# Patient Record
Sex: FEMALE | Race: BLACK OR AFRICAN AMERICAN | NOT HISPANIC OR LATINO | URBAN - METROPOLITAN AREA
[De-identification: names, ages, dates, MRNs, and addresses within clinical notes are randomized per-mention and may not be internally consistent; named-entity substitution may affect disease eponyms.]

---

## 2018-06-11 ENCOUNTER — EMERGENCY (EMERGENCY)
Facility: HOSPITAL | Age: 73
LOS: 1 days | Discharge: HOME | End: 2018-06-11
Attending: EMERGENCY MEDICINE
Payer: COMMERCIAL

## 2018-06-11 VITALS
DIASTOLIC BLOOD PRESSURE: 72 MMHG | HEART RATE: 69 BPM | SYSTOLIC BLOOD PRESSURE: 126 MMHG | TEMPERATURE: 98 F | HEIGHT: 66 IN | WEIGHT: 177.91 LBS | OXYGEN SATURATION: 98 % | RESPIRATION RATE: 16 BRPM

## 2018-06-11 VITALS
SYSTOLIC BLOOD PRESSURE: 132 MMHG | OXYGEN SATURATION: 98 % | DIASTOLIC BLOOD PRESSURE: 68 MMHG | TEMPERATURE: 98 F | RESPIRATION RATE: 17 BRPM | HEART RATE: 65 BPM

## 2018-06-11 PROCEDURE — 99053 MED SERV 10PM-8AM 24 HR FAC: CPT

## 2018-06-11 PROCEDURE — 99284 EMERGENCY DEPT VISIT MOD MDM: CPT | Mod: 25

## 2018-06-12 PROCEDURE — 72125 CT NECK SPINE W/O DYE: CPT

## 2018-06-12 PROCEDURE — 70450 CT HEAD/BRAIN W/O DYE: CPT | Mod: 26

## 2018-06-12 PROCEDURE — 99284 EMERGENCY DEPT VISIT MOD MDM: CPT | Mod: 25

## 2018-06-12 PROCEDURE — 70450 CT HEAD/BRAIN W/O DYE: CPT

## 2018-06-12 PROCEDURE — 72125 CT NECK SPINE W/O DYE: CPT | Mod: 26

## 2018-06-12 RX ORDER — ACETAMINOPHEN 500 MG
2 TABLET ORAL
Qty: 20 | Refills: 0 | OUTPATIENT
Start: 2018-06-12

## 2018-06-12 RX ORDER — ACETAMINOPHEN 500 MG
650 TABLET ORAL ONCE
Qty: 0 | Refills: 0 | Status: COMPLETED | OUTPATIENT
Start: 2018-06-12 | End: 2018-06-12

## 2018-06-12 RX ADMIN — Medication 650 MILLIGRAM(S): at 00:43

## 2018-06-12 RX ADMIN — Medication 650 MILLIGRAM(S): at 00:47

## 2018-06-12 NOTE — ED PROVIDER NOTE - OBJECTIVE STATEMENT
71 y/o F pt with no significant PMHx and no significant PSHx presents to the ED c/o intermittent R sided headache after mechanical fall x yesterday at 1650. Pt reports she was at [facility], when she tripped and fell over a suitcase, hitting her R head and shoulder. Pt's pmd instructed pt to take Tylenol for her pain, which pt has been taking to no relief. Pt denies LOC, nausea, vomiting or any other complaints. Allergies: Aspirin. (PMD: Dr. Finley).

## 2018-06-12 NOTE — ED PROVIDER NOTE - PHYSICAL EXAMINATION
GCS 15, no raccoon eyes, no Battles sign, no scalp step off deformities.   Pt c/o R forehead tenderness.

## 2018-06-12 NOTE — ED PROVIDER NOTE - MEDICAL DECISION MAKING DETAILS
No TBI on CT head, no fx on CT neck. Pt is well appearing walking with normal gait, stable for discharge and follow up with medical doctor. Pt educated on care and need for follow up. Discussed anticipatory guidance and return precautions. Questions answered. I had a detailed discussion with the patient and/or guardian regarding the historical points, exam findings, and any diagnostic results supporting the discharge diagnosis.

## 2018-06-15 ENCOUNTER — EMERGENCY (EMERGENCY)
Facility: HOSPITAL | Age: 73
LOS: 1 days | Discharge: HOME | End: 2018-06-15
Attending: EMERGENCY MEDICINE
Payer: COMMERCIAL

## 2018-06-15 VITALS
DIASTOLIC BLOOD PRESSURE: 90 MMHG | HEART RATE: 76 BPM | SYSTOLIC BLOOD PRESSURE: 155 MMHG | RESPIRATION RATE: 16 BRPM | TEMPERATURE: 98 F | WEIGHT: 169.98 LBS | OXYGEN SATURATION: 99 % | HEIGHT: 66 IN

## 2018-06-15 PROCEDURE — 99284 EMERGENCY DEPT VISIT MOD MDM: CPT

## 2018-06-15 PROCEDURE — 70450 CT HEAD/BRAIN W/O DYE: CPT

## 2018-06-15 PROCEDURE — 70450 CT HEAD/BRAIN W/O DYE: CPT | Mod: 26

## 2018-06-15 PROCEDURE — 99284 EMERGENCY DEPT VISIT MOD MDM: CPT | Mod: 25

## 2018-06-15 PROCEDURE — 99053 MED SERV 10PM-8AM 24 HR FAC: CPT

## 2018-06-15 NOTE — ED PROVIDER NOTE - MEDICAL DECISION MAKING DETAILS
73y/o F pt. concern for delay bleed. Will get CT Head. nosebleed does not seem to be CSF leak. will not do any further workup for that.

## 2018-06-15 NOTE — ED PROVIDER NOTE - MUSCULOSKELETAL, MLM
Spine appears normal, range of motion is not limited, no muscle or joint tenderness, no C-spine tenderness

## 2018-06-15 NOTE — ED PROVIDER NOTE - OBJECTIVE STATEMENT
73 y/o F pt w/ who was seen here about x1 week ago about having a fall in a luggage store when product slipped and had head strike at that time. Pt reports negative head CT. Since then, pt states that she has had persistent HAs. Pt denies vomiting, LOC at any time, or any other complaints. Today, she also noticed R sided nose-bleeding which self-resolved after holding pressure. Pt denies nose picking or aspirin, Plavix, or blood thinners use. Allergies: aspirin.

## 2018-06-15 NOTE — ED ADULT TRIAGE NOTE - CHIEF COMPLAINT QUOTE
" I have pain on my forehead and down to my nose, I hit my head last Sunday and I was here Monday and they did some test "

## 2018-06-16 VITALS
OXYGEN SATURATION: 98 % | RESPIRATION RATE: 16 BRPM | HEART RATE: 56 BPM | DIASTOLIC BLOOD PRESSURE: 86 MMHG | TEMPERATURE: 98 F | SYSTOLIC BLOOD PRESSURE: 133 MMHG

## 2019-01-25 ENCOUNTER — EMERGENCY (EMERGENCY)
Facility: HOSPITAL | Age: 74
LOS: 1 days | Discharge: HOME | End: 2019-01-25
Attending: EMERGENCY MEDICINE
Payer: COMMERCIAL

## 2019-01-25 VITALS — WEIGHT: 175.93 LBS | HEIGHT: 66 IN

## 2019-01-25 VITALS
OXYGEN SATURATION: 97 % | HEART RATE: 80 BPM | DIASTOLIC BLOOD PRESSURE: 76 MMHG | TEMPERATURE: 98 F | RESPIRATION RATE: 18 BRPM | SYSTOLIC BLOOD PRESSURE: 129 MMHG

## 2019-01-25 PROCEDURE — 73562 X-RAY EXAM OF KNEE 3: CPT | Mod: 26,RT

## 2019-01-25 PROCEDURE — 99284 EMERGENCY DEPT VISIT MOD MDM: CPT

## 2019-01-25 PROCEDURE — 82962 GLUCOSE BLOOD TEST: CPT

## 2019-01-25 PROCEDURE — 93971 EXTREMITY STUDY: CPT

## 2019-01-25 PROCEDURE — 73590 X-RAY EXAM OF LOWER LEG: CPT | Mod: 26,RT

## 2019-01-25 PROCEDURE — 73590 X-RAY EXAM OF LOWER LEG: CPT

## 2019-01-25 PROCEDURE — 93971 EXTREMITY STUDY: CPT | Mod: 26,RT

## 2019-01-25 PROCEDURE — 73552 X-RAY EXAM OF FEMUR 2/>: CPT | Mod: 26,RT

## 2019-01-25 PROCEDURE — 73562 X-RAY EXAM OF KNEE 3: CPT

## 2019-01-25 PROCEDURE — 99284 EMERGENCY DEPT VISIT MOD MDM: CPT | Mod: 25

## 2019-01-25 PROCEDURE — 73552 X-RAY EXAM OF FEMUR 2/>: CPT

## 2019-01-25 RX ORDER — ACETAMINOPHEN 500 MG
650 TABLET ORAL ONCE
Qty: 0 | Refills: 0 | Status: COMPLETED | OUTPATIENT
Start: 2019-01-25 | End: 2019-01-25

## 2019-01-25 RX ADMIN — Medication 325 MILLIGRAM(S): at 20:01

## 2019-01-25 NOTE — ED PROVIDER NOTE - MEDICAL DECISION MAKING DETAILS
72 y/o F presents to the ED with leg pain and swelling x 1 month. Will check US Doppler to r/o DVT, give analgesias, check xray of the femur, knee, tibia, and fibula, and reassess.

## 2019-01-25 NOTE — ED PROVIDER NOTE - OBJECTIVE STATEMENT
72 y/o F with a significant PMHx of HTN and no significant PSHx presents to the ED with c/o RLE pain x 1 month that has worsened the past 2 days. Pt states the pain is in the right posterior thigh, knee, and lower leg and describes the pain as soreness. Pt notes there has been some swelling but has been able to ambulate. Pt reports she suffered her last fall in 06/18 and her leg has not been at baseline since the fall. Pt denies numbness, weakness, recent trauma, SOB, chest pain, lightheadedness, or any other complaints. Allergies: aspirin.

## 2019-01-25 NOTE — ED PROVIDER NOTE - PHYSICAL EXAMINATION
RLE: varicose veins present; pain in b/l lower extremities with mild swelling of RLE relative to left; no reproducible tenderness; effusion of R knee; intact ROM and strength; no TTP through RLE

## 2019-02-05 ENCOUNTER — APPOINTMENT (OUTPATIENT)
Age: 74
End: 2019-02-05
Payer: MEDICARE

## 2019-02-05 ENCOUNTER — APPOINTMENT (OUTPATIENT)
Dept: VASCULAR SURGERY | Facility: CLINIC | Age: 74
End: 2019-02-05
Payer: MEDICARE

## 2019-02-05 VITALS
SYSTOLIC BLOOD PRESSURE: 147 MMHG | HEIGHT: 66 IN | BODY MASS INDEX: 28.28 KG/M2 | HEART RATE: 69 BPM | DIASTOLIC BLOOD PRESSURE: 81 MMHG | WEIGHT: 176 LBS

## 2019-02-05 PROCEDURE — 99204 OFFICE O/P NEW MOD 45 MIN: CPT

## 2019-02-05 PROCEDURE — 93971 EXTREMITY STUDY: CPT

## 2019-02-05 NOTE — CONSULT LETTER
[Dear  ___] : Dear  [unfilled], [Consult Letter:] : I had the pleasure of evaluating your patient, [unfilled]. [Please see my note below.] : Please see my note below. [Consult Closing:] : Thank you very much for allowing me to participate in the care of this patient.  If you have any questions, please do not hesitate to contact me. [Sincerely,] : Sincerely, [FreeTextEntry3] : Yayo Watters M.D., FCAROLIN., R.P.V.I.\par \par  [facility]\par  of Vascular Surgery\par [facility] [place]\par

## 2019-02-05 NOTE — HISTORY OF PRESENT ILLNESS
[FreeTextEntry1] : 73-year-old female presents to the office complaining of right leg pain. She says the pain began after a fall several months ago. She notes that the pain is worse with lying down and it is particularly hard to begin walking. Once ambulation starts pain improves. Her history is also significant for having varicose vein surgery of the right leg several years ago. She is here for evaluation.

## 2019-02-05 NOTE — ASSESSMENT
[FreeTextEntry1] : Patient underwent a venous duplex study which shows no evidence of DVT. There is scattered insufficiency there are. Given the patient's history most likely this is arthritic or sciatic in nature. No need for venous intervention at this time.

## 2019-02-05 NOTE — PHYSICAL EXAM
[JVD] : no jugular venous distention  [Normal Breath Sounds] : Normal breath sounds [Normal Heart Sounds] : normal heart sounds [Femoral Arteries] : Normal femoral pulses [2+] : left 2+ [Ankle Swelling (On Exam)] : not present [Varicose Veins Of Lower Extremities] : bilaterally [Ankle Swelling On The Left] : moderate [] : not present [Abdomen Tenderness] : ~T ~M No abdominal tenderness [No Rash or Lesion] : No rash or lesion [Alert] : alert [Calm] : calm [de-identified] : appears well [de-identified] : sensory and motor intact

## 2019-04-25 ENCOUNTER — EMERGENCY (EMERGENCY)
Facility: HOSPITAL | Age: 74
LOS: 1 days | Discharge: HOME | End: 2019-04-25
Attending: EMERGENCY MEDICINE
Payer: COMMERCIAL

## 2019-04-25 VITALS
HEART RATE: 72 BPM | HEIGHT: 66 IN | SYSTOLIC BLOOD PRESSURE: 114 MMHG | DIASTOLIC BLOOD PRESSURE: 74 MMHG | OXYGEN SATURATION: 100 % | WEIGHT: 186.95 LBS | TEMPERATURE: 98 F | RESPIRATION RATE: 18 BRPM

## 2019-04-25 PROCEDURE — 73562 X-RAY EXAM OF KNEE 3: CPT | Mod: 26,RT

## 2019-04-25 PROCEDURE — 99283 EMERGENCY DEPT VISIT LOW MDM: CPT | Mod: 25

## 2019-04-25 PROCEDURE — 99283 EMERGENCY DEPT VISIT LOW MDM: CPT

## 2019-04-25 PROCEDURE — 73562 X-RAY EXAM OF KNEE 3: CPT

## 2019-04-25 RX ORDER — ACETAMINOPHEN 500 MG
975 TABLET ORAL ONCE
Qty: 0 | Refills: 0 | Status: COMPLETED | OUTPATIENT
Start: 2019-04-25 | End: 2019-04-25

## 2019-04-25 RX ADMIN — Medication 975 MILLIGRAM(S): at 19:13

## 2019-04-25 NOTE — ED PROVIDER NOTE - OBJECTIVE STATEMENT
72 y/o F presents to the ED with complaints of R knee pain. Patient notes had fall 6/2018, patient was seen in the ED and advised to follow with vascular surgeon for varicose veins. Knee X-ray was negative at that time. Patient notes gradual onset of intermittent R knee pain since fall. Patient notes pain has since worsened with associated swelling. Pain is worsened with going down stairs, with unsteady feeling. Patient has been taking Gabapentin with relief of symptoms. Denies any other acute complaints. 72 y/o F presents to the ED with complaints of R knee pain. Patient notes had fall 6/2018, patient was seen in the ED and advised to follow with vascular surgeon for varicose veins. Knee X-ray was negative at that time as per patient. Patient notes gradual onset of intermittent R knee pain since fall. Patient notes pain has since worsened x 1 year with associated swelling. Pain is worsened with going down stairs, with unsteady feeling. Patient has been taking Gabapentin with relief of symptoms. Denies any feverm chills, redness, other joint pain or any other acute complaints.

## 2019-04-25 NOTE — ED PROVIDER NOTE - CLINICAL SUMMARY MEDICAL DECISION MAKING FREE TEXT BOX
74 y/o F presents with R knee pain. Pain possibly due to chronic superficial phlebitis versus osteoarthritis. Will obtain X-ray, provide Tylenol and reassess.

## 2019-04-25 NOTE — ED PROVIDER NOTE - PHYSICAL EXAMINATION
R knee mild swelling without increased heat or ariel tenderness. Full ROM. Multiple superficial veins along medial knee to calf and shin area. Distal pulses intact, 2+.

## 2019-05-07 PROBLEM — I87.2 VENOUS INSUFFICIENCY (CHRONIC) (PERIPHERAL): Chronic | Status: ACTIVE | Noted: 2019-04-25

## 2019-05-07 PROBLEM — I80.01 PHLEBITIS AND THROMBOPHLEBITIS OF SUPERFICIAL VESSELS OF RIGHT LOWER EXTREMITY: Chronic | Status: ACTIVE | Noted: 2019-04-25

## 2019-05-15 ENCOUNTER — APPOINTMENT (OUTPATIENT)
Dept: ORTHOPEDIC SURGERY | Facility: CLINIC | Age: 74
End: 2019-05-15

## 2019-05-31 ENCOUNTER — APPOINTMENT (OUTPATIENT)
Dept: ORTHOPEDIC SURGERY | Facility: CLINIC | Age: 74
End: 2019-05-31
Payer: MEDICAID

## 2019-05-31 VITALS
SYSTOLIC BLOOD PRESSURE: 136 MMHG | HEIGHT: 66 IN | WEIGHT: 178 LBS | DIASTOLIC BLOOD PRESSURE: 84 MMHG | BODY MASS INDEX: 28.61 KG/M2 | HEART RATE: 65 BPM

## 2019-05-31 DIAGNOSIS — Z78.9 OTHER SPECIFIED HEALTH STATUS: ICD-10-CM

## 2019-05-31 PROCEDURE — 99204 OFFICE O/P NEW MOD 45 MIN: CPT | Mod: 25

## 2019-05-31 PROCEDURE — 20611 DRAIN/INJ JOINT/BURSA W/US: CPT | Mod: RT

## 2019-05-31 PROCEDURE — 73564 X-RAY EXAM KNEE 4 OR MORE: CPT | Mod: RT

## 2019-05-31 NOTE — HISTORY OF PRESENT ILLNESS
[de-identified] : CC Right knee\par \par HPI 74 yo F RHD presents with [chronic] onset of months of constant pain in themedial  Right knee [without injury]. The pain is [worse], and rated a 9 out of 10, described as sharp, with rad to the ankle. [Rest] makes the pain better and [walking standing stairs] makes the pain worse. The patient reports associated symptoms of pain. The patient - pain at night affecting sleep, and - similar pain previously.\par \par The patient has tried the following treatments:\par Activity modification	+\par Ice/Compression  	+\par Braces    		-\par Nsaids    		+\par Physical Therapy 	-\par Cortisone Injection	-\par Visco Injection		-\par Arthroscopy		-\par \par Review of Systems is positive for the above musculoskeletal symptoms and is otherwise non-contributory for general, constitutional, psychiatric, neurologic, HEENT, cardiac, respiratory, gastrointestinal, reproductive, lymphatic, and dermatologic complaints.\par \par Consult by \shashank \par

## 2019-05-31 NOTE — PHYSICAL EXAM
[de-identified] : Physical Examination\par General: well nourished, in no acute distress, alert and oriented to person, place and time\par Psychiatric: normal mood and affect, no abnormal movements or speech patterns\par Eyes: vision intact - glasses\par Throat: no thyromegaly\par Lymph: no enlarged nodes, no lymphedema in extremity\par Respiratory: no wheezing, no shortness of breath with ambulation\par Cardiac: no cardiac leg swelling, 2+ peripheral pulses\par Neurology: normal gross sensation in extremities to light touch\par Abdomen: soft, non-tender, tympanic, no masses\par \par Musculoskeletal Examination\par Ambulation	+ antalgic gait, - assistive devices\par \par Knee			Right			Left\par General\par      Swelling/Deformity	normal			normal	\par      Skin			normal			normal\par      Erythema		-			-\par      Standing Alignment	varus			neutral\par      Effusion		trace			none\par Range of Motion\par      Hip			full painless ROM		full painless ROM\par      Knee Flexion		100			110\par      Knee Extension	0			0\par Patella\par      J Sign		-			-\par      Quad Medial/Lateral	1/1 1/1\par      Apprehension		-			-\par      Padilla's		+			-\par      Grind Sign		+			-\par      Crepitus		+			-\par Palpation\par      Medial Joint Line	+			-\par      Medial Fem Condyle	-			-\par      Lateral Joint Line	-			-\par      Quad Tendon		-			-\par      Patella Tendon	-			-\par      Medial Patella		-			-\par      Lateral Patella 	-			-\par      Posterior Knee	+			-\par Ligamentous\par      Varus @ 0° / 30°	-/-			-/-\par      Valgus @ 0° / 30°	-/-			-/-\par Strength Examination/Atrophy\par      Hip Flexors 		5+			5+\par      Quadriceps		5+			5+\par      Hamstring		5+			5+\par      Tibialis Anterior	5+			5+\par      Achilles/Soleus	5+			5+\par Sensation\par      Deep Peroneal	normal			normal\par      Superficial Peroneal 	normal			normal\par      Sural  		normal			normal\par      Posterior Tibial 	normal			normal\par      Saphneous 		normal			normal\par Pulses\par      DP			2+			2+ [de-identified] : 5 views of the affected Right knee (standing AP, flexing standing AP, 30degree flexed lateral, 0degree lateral, sunrise view)\par were ordered, obtained and evaluated by myself today and\par demonstrate:\par There is moderate to severe medial WB asymmetric narrowing\par moderate osteophytic lipping\par trace suprapatellar effusion\par Large osteophytes with mild patellofemoral joint space loss without evidence of tilt [or] subluxation on sunrise view\par Normal soft tissue density\par Otherwise normal osseous bone structure without fracture or dislocation

## 2019-05-31 NOTE — DISCUSSION/SUMMARY
[de-identified] : Right knee osteoarthritis\par \par The patient and I discussed the causes and progression of degenerative joint disease of the knee. Models, diagrams and drawings were used in the discussion. Treatment can include conservative non-operative management and surgical options. Conservative management includes weight loss, activity modification, physical therapy to improve motion and strength in the muscles around the knee and the body's core, PO and topical NSAIDs, corticosteroid and/or viscosupplementation intra-articular injections. If the patient fails to improve with non-operative management, surgical management is possible. Depending upon the patient's age, BMI, activity level, degree and location of arthrosis different surgical options are possible including arthroscopic debridement with chondroplasty, high-tibial osteotomy, unicondylar/partial arthroplasty, and total joint arthroplasty.\par \par Physical therapy was prescribed for knee ROM exercises, strengthening exercises, deep tissue massage, core strengthening, hip abductor strengthening, VMO strengthening, modalities PRN, and home exercises\par The patient was prescribed Diclofenac PO non-steroidal anti-inflammatory medication. 50mg tablets twice daily to be taken for at least 1-2 weeks in a row and then PRN afterwards. Risks and benefits were discussed and include but not limited to renal damage and GI ulceration and bleeding.  They were advised to take with food to limit stomach upset as well as warned to stop the medication if worsening gastric pain or dizziness or other side effects. Also to immediately stop the medication and seek appropriate medical attention if any severe stomach ache, gastritis, black/red vomit, black/red stools or any other medical concern.\par \par Procedure Note:\par \par Verbal consent was obtained for an arthrocentesis and intra-articular corticosteroid injection of the RIGHT knee, after the risks and benefits were discussed with the patient. Potential adverse effects were discussed including but not limited to bleeding, skin/joint infection, local skin reactions including bleaching, bruising, stiffness, soreness, vasovagal episodes, transient hyperglycemia, avascular necrosis, pseudo-septic type reactions, post injection joint pain, allergic reaction to product or anesthetic and other rare but potential adverse effects along with benefits including decreased pain and improved stability prior to obtaining verbal informed consent. It was also discussed that for some patients the treatment is ineffective and there are no guarantees that the patient will experience improvement as the result of the injection. In rare occasions the injection can cause worsening of pain.\par \par The use of a Sonosite 15-6 MHz linear transducer with live ultrasound guidance of the knee was necessary given the patient's BMI and local body habitus overlying and obscuring the accurate identification of normal body bony anatomy used to identify the injection site and the depth of soft tissue envelope necessitating a longer than normal needle to reach the joint space, and to confirm the location of the needle tip and intra-articular delivery of the medication. Without the use of live ultrasound guidance the injection would have been more difficult and place the patient's neurovascular structures at risk from the longer needle needed to traverse the soft tissue envelope.\par \par A 6 inch bolster was placed underneath the knee to place the knee in a slightly flexed position. The superolateral injection site was identified using the ultrasound probe to identify the suprapatellar space and superior boarder of the patella first longitudinally and then transversely. The injection site was marked and prepped with a ChloraPrep swab and anesthetized with ethylchloride skin anesthesia. Using sterile technique a 20g 3 1/2 in spinal needle with 3 cc total of 1cc 1% lidocaine without epinephrine, 1cc 0.25% Marcaine without epinephrine and 1cc of 40mg/mL Kenalog was passed through the injection site towards the suprapatellar space under live ultrasound guidance and noted to penetrate the joint capsule. The medication was injected without resistance under live ultrasound visualization and noted to flow into the suprapatellar joint space. The injection site was sterilely dressed, there was minimal blood loss. The patient tolerated this procedure without any complications done by myself. Images were recorded and saved.\par \par The patient has been advised that if they notice any worsening of symptoms or any problems to contact me and seek care from a qualified medical professional. The patient was instructed to ice the knee and take NSAID medication on an as needed basis if the patient feels discomfort.\par \par The patient verifies their understanding the the visit, diagnosis and plan. They agree with the treatment plan and will contact the office with any questions or problems.\par Follow up\par PRN

## 2019-06-05 RX ORDER — DICLOFENAC SODIUM 16.05 MG/ML
1.5 SOLUTION TOPICAL
Qty: 1 | Refills: 0 | Status: ACTIVE | COMMUNITY
Start: 2019-06-05 | End: 1900-01-01

## 2019-06-05 RX ORDER — DICLOFENAC SODIUM 10 MG/G
1 GEL TOPICAL DAILY
Qty: 1 | Refills: 2 | Status: ACTIVE | COMMUNITY
Start: 2019-06-05 | End: 1900-01-01

## 2019-08-13 ENCOUNTER — APPOINTMENT (OUTPATIENT)
Dept: ORTHOPEDIC SURGERY | Facility: CLINIC | Age: 74
End: 2019-08-13
Payer: MEDICARE

## 2019-08-13 VITALS
SYSTOLIC BLOOD PRESSURE: 128 MMHG | HEIGHT: 66 IN | BODY MASS INDEX: 28.93 KG/M2 | HEART RATE: 71 BPM | WEIGHT: 180 LBS | DIASTOLIC BLOOD PRESSURE: 79 MMHG

## 2019-08-13 PROCEDURE — 99214 OFFICE O/P EST MOD 30 MIN: CPT

## 2019-08-13 NOTE — PHYSICAL EXAM
[de-identified] : 5 views of the affected Right knee (standing AP, flexing standing AP, 30degree flexed lateral, 0degree lateral, sunrise view)\par 5-31-19\par demonstrate:\par There is moderate to severe medial WB asymmetric narrowing\par moderate osteophytic lipping\par trace suprapatellar effusion\par Large osteophytes with mild patellofemoral joint space loss without evidence of tilt [or] subluxation on sunrise view\par Normal soft tissue density\par Otherwise normal osseous bone structure without fracture or dislocation\par \par  [de-identified] : Physical Examination\par General: well nourished, in no acute distress, alert and oriented to person, place and time\par Psychiatric: normal mood and affect, no abnormal movements or speech patterns\par Eyes: vision intact - glasses\par Throat: no thyromegaly\par Lymph: no enlarged nodes, no lymphedema in extremity\par Respiratory: no wheezing, no shortness of breath with ambulation\par Cardiac: no cardiac leg swelling, 2+ peripheral pulses\par Neurology: normal gross sensation in extremities to light touch\par Abdomen: soft, non-tender, tympanic, no masses\par \par Musculoskeletal Examination\par Ambulation	+ antalgic gait, - assistive devices\par \par Knee			Right			Left\par General\par      Swelling/Deformity	normal			normal	\par      Skin			normal			normal\par      Erythema		-			-\par      Standing Alignment	varus			neutral\par      Effusion		trace			none\par Range of Motion\par      Hip			full painless ROM		full painless ROM\par      Knee Flexion		90			110\par      Knee Extension	0			0\par Patella\par      J Sign		-			-\par      Quad Medial/Lateral	1/1 1/1\par      Apprehension		-			-\par      Padilla's		+			-\par      Grind Sign		+			-\par      Crepitus		+			-\par Palpation\par      Medial Joint Line	+			-\par      Medial Fem Condyle	-			-\par      Lateral Joint Line	-			-\par      Quad Tendon		-			-\par      Patella Tendon	-			-\par      Medial Patella		-			-\par      Lateral Patella 	-			-\par      Posterior Knee	+			-\par Ligamentous\par      Varus @ 0° / 30°	-/-			-/-\par      Valgus @ 0° / 30°	-/-			-/-\par Strength Examination/Atrophy\par      Hip Flexors 		5+			5+\par      Quadriceps		5+			5+\par      Hamstring		5+			5+\par      Tibialis Anterior	5+			5+\par      Achilles/Soleus	5+			5+\par Sensation\par      Deep Peroneal	normal			normal\par      Superficial Peroneal 	normal			normal\par      Sural  		normal			normal\par      Posterior Tibial 	normal			normal\par      Saphneous 		normal			normal\par Pulses\par      DP			2+			2+

## 2019-08-13 NOTE — HISTORY OF PRESENT ILLNESS
[de-identified] : CC Right knee\par \par HPI 74 yo F RHD presents CSI and PT FU of [chronic] onset of months of constant pain in the medial  Right knee [without injury]. The pain is [worse], and rated a 9 out of 10, described as sharp, with rad to the ankle. [Rest] makes the pain better and [walking standing stairs] makes the pain worse. The patient reports associated symptoms of pain. The patient - pain at night affecting sleep, and - similar pain previously.\par \par The patient has tried the following treatments:\par Activity modification	+\par Ice/Compression  	+\par Braces    		-\par Nsaids    		+\par Physical Therapy 	+ very happy w PT, improving\par Cortisone Injection	+ very happy, improved pain\par Visco Injection		-\par Arthroscopy		-\par \par wanted to talk about her knee swelling and motion, and get brace and cane\par \par Review of Systems is positive for the above musculoskeletal symptoms and is otherwise non-contributory for general, constitutional, psychiatric, neurologic, HEENT, cardiac, respiratory, gastrointestinal, reproductive, lymphatic, and dermatologic complaints.\par \par Consult by Dr Hien Montelongo\par \par

## 2019-08-13 NOTE — DISCUSSION/SUMMARY
[de-identified] : Right knee osteoarthritis\par \par The patient and I discussed the causes and progression of degenerative joint disease of the knee. Models, diagrams and drawings were used in the discussion. Treatment can include conservative non-operative management and surgical options. Conservative management includes weight loss, activity modification, physical therapy to improve motion and strength in the muscles around the knee and the body's core, PO and topical NSAIDs, corticosteroid and/or viscosupplementation intra-articular injections. If the patient fails to improve with non-operative management, surgical management is possible. Depending upon the patient's age, BMI, activity level, degree and location of arthrosis different surgical options are possible including arthroscopic debridement with chondroplasty, high-tibial osteotomy, unicondylar/partial arthroplasty, and total joint arthroplasty.\par \par Continue Physical therapy was prescribed for knee ROM exercises, strengthening exercises, deep tissue massage, core strengthening, hip abductor strengthening, VMO strengthening, modalities PRN, and home exercises\par \par Continue prescribed Diclofenac PO non-steroidal anti-inflammatory medication. 50mg tablets twice daily to be taken for at least 1-2 weeks in a row and then PRN afterwards. Risks and benefits were discussed and include but not limited to renal damage and GI ulceration and bleeding.  They were advised to take with food to limit stomach upset as well as warned to stop the medication if worsening gastric pain or dizziness or other side effects. Also to immediately stop the medication and seek appropriate medical attention if any severe stomach ache, gastritis, black/red vomit, black/red stools or any other medical concern.\par \par discussed at length the "swelling" in her knee was infrapatellar fat pads and suprapatellar fat pad, similar in size and shape to other knee. the knee had minimal swelling in the knee effusion. there are varicose veins, compression stocking and vein management should be done by vascular surgery.\par \par knee rom decrease is chronic nature of DJD, worsening bone spurs and motion with time and progression of the arthritis. Patient reports gradual loss of motion and pain at extremes of motion, explained this is normal arthritic pain and expected loss of motion. only surgrey can remove the spurs and help motion and decrease pain fully. patient refusing any surgery.\par \par cane and knee brace script\par \par greater than 25 minutes were spent on visit discussing issues, answering questions and coodinating care.\par \par The patient verifies their understanding the the visit, diagnosis and plan. They agree with the treatment plan and will contact the office with any questions or problems.\par \par Follow up\par PRN

## 2019-08-16 ENCOUNTER — OTHER (OUTPATIENT)
Age: 74
End: 2019-08-16

## 2019-12-11 NOTE — ED ADULT NURSE NOTE - NURSING MUSC JOINTS
Pre-Operative Diagnosis: Left lumbosacral radiculopathy [M54.17]     Post-Operative Diagnosis: Left lumbosacral radiculopathy [M54.17]      Procedure Performed:   Procedure(s):  Lumbar 2- Lumbar 3 removal of hardware, left lumbar 2- lumbar 3 Facetectomy
knee/yes (describe)

## 2020-10-29 NOTE — ED PROVIDER NOTE - PROGRESS NOTE DETAILS
Liliam Feeling better. Xray shows severe arthritis. Will refer to ortho for further evaluation. Pt is well appearing walking with steady gait, stable for discharge and follow up without fail with medical doctor. I had a detailed discussion with the patient and/or guardian regarding the historical points, exam findings, and any diagnostic results supporting the discharge diagnosis. Pt educated on care and need for follow up. Strict return instructions and red flag signs and symptoms discussed with patient. Questions answered. Pt shows understanding of discharge information and agrees to follow.

## 2020-11-03 ENCOUNTER — APPOINTMENT (OUTPATIENT)
Dept: ORTHOPEDIC SURGERY | Facility: CLINIC | Age: 75
End: 2020-11-03
Payer: MEDICARE

## 2020-11-03 VITALS
DIASTOLIC BLOOD PRESSURE: 92 MMHG | HEIGHT: 66 IN | BODY MASS INDEX: 29.41 KG/M2 | WEIGHT: 183 LBS | HEART RATE: 91 BPM | SYSTOLIC BLOOD PRESSURE: 158 MMHG

## 2020-11-03 DIAGNOSIS — M17.11 UNILATERAL PRIMARY OSTEOARTHRITIS, RIGHT KNEE: ICD-10-CM

## 2020-11-03 PROCEDURE — 73564 X-RAY EXAM KNEE 4 OR MORE: CPT | Mod: RT

## 2020-11-03 PROCEDURE — 99072 ADDL SUPL MATRL&STAF TM PHE: CPT

## 2020-11-03 PROCEDURE — 99213 OFFICE O/P EST LOW 20 MIN: CPT | Mod: 25

## 2020-11-03 PROCEDURE — 20611 DRAIN/INJ JOINT/BURSA W/US: CPT | Mod: RT

## 2020-11-03 NOTE — PHYSICAL EXAM
[de-identified] : Physical Examination\par General: well nourished, in no acute distress, alert and oriented to person, place and time\par Psychiatric: normal mood and affect, no abnormal movements or speech patterns\par Eyes: vision intact - glasses\par Throat: no thyromegaly\par Lymph: no enlarged nodes, no lymphedema in extremity\par Respiratory: no wheezing, no shortness of breath with ambulation\par Cardiac: no cardiac leg swelling, 2+ peripheral pulses\par Neurology: normal gross sensation in extremities to light touch\par Abdomen: soft, non-tender, tympanic, no masses\par \par Musculoskeletal Examination\par Ambulation	+ antalgic gait, - assistive devices\par \par Knee			Right			Left\par General\par      Swelling/Deformity	normal			normal	\par      Skin			normal			normal\par      Erythema		-			-\par      Standing Alignment	varus			neutral\par      Effusion		trace			none\par Range of Motion\par      Hip			full painless ROM		full painless ROM\par      Knee Flexion		90			110\par      Knee Extension	0			0\par Patella\par      J Sign		-			-\par      Quad Medial/Lateral	1/1 1/1\par      Apprehension		-			-\par      Padilla's		+			-\par      Grind Sign		+			-\par      Crepitus		+			-\par Palpation\par      Medial Joint Line	+			-\par      Medial Fem Condyle	-			-\par      Lateral Joint Line	-			-\par      Quad Tendon		-			-\par      Patella Tendon	-			-\par      Medial Patella		-			-\par      Lateral Patella 	-			-\par      Posterior Knee	+			-\par Ligamentous\par      Varus @ 0° / 30°	-/-			-/-\par      Valgus @ 0° / 30°	-/-			-/-\par Strength Examination/Atrophy\par      Hip Flexors 		5+			5+\par      Quadriceps		5+			5+\par      Hamstring		5+			5+\par      Tibialis Anterior	5+			5+\par      Achilles/Soleus	5+			5+\par Sensation\par      Deep Peroneal	normal			normal\par      Superficial Peroneal 	normal			normal\par      Sural  		normal			normal\par      Posterior Tibial 	normal			normal\par      Saphneous 		normal			normal\par Pulses\par      DP			2+			2+ [de-identified] : 5 views of the affected Right knee (standing AP, flexing standing AP, 30degree flexed lateral, 0degree lateral, sunrise view)\par 5-31-19\par demonstrate:\par There is moderate to severe medial WB asymmetric narrowing\par moderate osteophytic lipping\par trace suprapatellar effusion\par Large osteophytes with mild patellofemoral joint space loss without evidence of tilt [or] subluxation on sunrise view\par Normal soft tissue density\par Otherwise normal osseous bone structure without fracture or dislocation\par \par 4 views of the affected Right knee (standing AP, flexing standing AP, 30degree flexed lateral, sunrise view)\par were ordered, obtained and evaluated by myself today and\par demonstrate:\par There is severe flexed knee asymmetric narrowing\par moderate osteophytic lipping\par trace suprapatellar effusion\par Large osteophytes with mild patellofemoral joint space loss without evidence of tilt [or] subluxation on sunrise view\par Normal soft tissue density\par Otherwise normal osseous bone structure without fracture or dislocation

## 2020-11-03 NOTE — DISCUSSION/SUMMARY
[de-identified] : Right knee osteoarthritis\par \par The patient and I discussed the causes and progression of degenerative joint disease of the knee. Models, diagrams and drawings were used in the discussion. Treatment can include conservative non-operative management and surgical options. Conservative management includes weight loss, activity modification, physical therapy to improve motion and strength in the muscles around the knee and the body's core, PO and topical NSAIDs, corticosteroid and/or viscosupplementation intra-articular injections. If the patient fails to improve with non-operative management, surgical management is possible. Depending upon the patient's age, BMI, activity level, degree and location of arthrosis different surgical options are possible including arthroscopic debridement with chondroplasty, high-tibial osteotomy, unicondylar/partial arthroplasty, and total joint arthroplasty.\par \par Continue Physical therapy was prescribed for knee ROM exercises, strengthening exercises, deep tissue massage, core strengthening, hip abductor strengthening, VMO strengthening, modalities PRN, and home exercises\par \par Continue prescribed Diclofenac PO non-steroidal anti-inflammatory medication. 50mg tablets twice daily to be taken for at least 1-2 weeks in a row and then PRN afterwards. Risks and benefits were discussed and include but not limited to renal damage and GI ulceration and bleeding.  They were advised to take with food to limit stomach upset as well as warned to stop the medication if worsening gastric pain or dizziness or other side effects. Also to immediately stop the medication and seek appropriate medical attention if any severe stomach ache, gastritis, black/red vomit, black/red stools or any other medical concern.\par \par discussed at length the "swelling" in her knee was infrapatellar fat pads and suprapatellar fat pad, similar in size and shape to other knee. the knee had minimal swelling in the knee effusion. there are varicose veins, compression stocking and vein management should be done by vascular surgery.\par \par patient does not want PT\par patient does not want total knee replacement. discussed that menisectomy would not help given the bone on bone arthrosis seen on XR today\par \par Procedure Note:\par \par Verbal consent was obtained for an arthrocentesis and intra-articular corticosteroid injection of the RIGHT knee, after the risks and benefits were discussed with the patient. Potential adverse effects were discussed including but not limited to bleeding, skin/joint infection, local skin reactions including bleaching, bruising, stiffness, soreness, vasovagal episodes, transient hyperglycemia, avascular necrosis, pseudo-septic type reactions, post injection joint pain, allergic reaction to product or anesthetic and other rare but potential adverse effects along with benefits including decreased pain and improved stability prior to obtaining verbal informed consent. It was also discussed that for some patients the treatment is ineffective and there are no guarantees that the patient will experience improvement as the result of the injection. In rare occasions the injection can cause worsening of pain.\par \par The use of a Sonosite 15-6 MHz linear transducer with live ultrasound guidance of the knee was necessary given the patient's BMI and local body habitus overlying and obscuring the accurate identification of normal body bony anatomy used to identify the injection site and the depth of soft tissue envelope necessitating a longer than normal needle to reach the joint space, and to confirm the location of the needle tip and intra-articular delivery of the medication. Without the use of live ultrasound guidance the injection would have been more difficult and place the patient's neurovascular structures at risk from the longer needle needed to traverse the soft tissue envelope.\par \par A 6 inch bolster was placed underneath the knee to place the knee in a slightly flexed position. The superolateral injection site was identified using the ultrasound probe to identify the suprapatellar space and superior boarder of the patella first longitudinally and then transversely. The injection site was marked and prepped with a ChloraPrep swab and anesthetized with ethylchloride skin anesthesia. Using sterile technique a 20g 3 1/2 in spinal needle with 3 cc total of 1cc 1% lidocaine without epinephrine, 1cc 0.25% Marcaine without epinephrine and 1cc of 40mg/mL Kenalog was passed through the injection site towards the suprapatellar space under live ultrasound guidance and noted to penetrate the joint capsule. The medication was injected without resistance under live ultrasound visualization and noted to flow into the suprapatellar joint space. The injection site was sterilely dressed, there was minimal blood loss. The patient tolerated this procedure without any complications done by myself. Images were recorded and saved.\par \par The patient has been advised that if they notice any worsening of symptoms or any problems to contact me and seek care from a qualified medical professional. The patient was instructed to ice the knee and take NSAID medication on an as needed basis if the patient feels discomfort.\par \par Due to failure of prior non-operative modalities of treatment including physical therapy, activity modification, non-steroidal anti-inflammatory medications, and weight-loss with failure of multiple prior corticosteroid injections without appropriate improvement in symptoms and concern for too many frequent injections causing increased risk for adverse events, I am ordering a time release cortisone injection Zilretta right knee and will obtain insurance authorization. The patient will be contacted by our authorization department over its status, copayment and when available for injection.\par \par The patient verifies their understanding the the visit, diagnosis and plan. They agree with the treatment plan and will contact the office with any questions or problems.\par \par Follow up\par PRN

## 2020-11-03 NOTE — HISTORY OF PRESENT ILLNESS
[de-identified] : CC Right knee\par \par HPI 72 yo F RHD presents FU of [chronic] onset of months of constant pain in the medial  Right knee [without injury]. The pain is [worse], and rated a 9 out of 10, described as sharp, with rad to the ankle. [Rest] makes the pain better and [walking standing stairs] makes the pain worse. The patient reports associated symptoms of pain. The patient - pain at night affecting sleep, and - similar pain previously.\par \par The patient has tried the following treatments:\par Activity modification	+\par Ice/Compression  	+\par Braces    		-\par Nsaids    		+\par Physical Therapy 	+ very happy w PT, improving\par Cortisone Injection	+ very happy, improved pain\par Visco Injection		-\par Arthroscopy		-\par \par \par \par Review of Systems is positive for the above musculoskeletal symptoms and is otherwise non-contributory for general, constitutional, psychiatric, neurologic, HEENT, cardiac, respiratory, gastrointestinal, reproductive, lymphatic, and dermatologic complaints.\par \par Consult by Dr Hien Montelongo\par \par

## 2022-01-13 NOTE — ED ADULT TRIAGE NOTE - STATUS:
Fax received from [facility] indicating they received an inquiry for coverage for Botox.  No record of new prior authorization found in chart.  No phone number listed on fax.  Writer contacted [facility], [phone], and they did not submit a new prior authorization.  They have an active prior authorization on file.  They do, however, need a new prescription for the Botox.  New prescription sent.  Patient has appointment on 1/31/22.  
Applied

## 2022-09-12 NOTE — ED PROVIDER NOTE - TOBACCO USE
Never smoker Closure 2 Information: This tab is for additional flaps and grafts, including complex repair and grafts and complex repair and flaps. You can also specify a different location for the additional defect, if the location is the same you do not need to select a new one. We will insert the automated text for the repair you select below just as we do for solitary flaps and grafts. Please note that at this time if you select a location with a different insurance zone you will need to override the ICD10 and CPT if appropriate.

## 2024-03-22 NOTE — ED ADULT NURSE NOTE - PAIN RATING/NUMBER SCALE (0-10): REST
Preceptor Attestation:    I discussed the patient with the resident and evaluated the patient in person. I have verified the content of the note, which accurately reflects my assessment of the patient and the plan of care.   Supervising Physician:  Oleksandr Dunn MD.     5

## 2024-07-09 NOTE — ED ADULT TRIAGE NOTE - MEANS OF ARRIVAL
oriented to person, place and time , normal sensation , short and long term memory intact
ambulatory

## 2024-10-17 NOTE — ED ADULT NURSE NOTE - NS ED NOTE ABUSE RESPONSE YN
Yes Detail Level: Detailed Quality 431: Preventive Care And Screening: Unhealthy Alcohol Use - Screening: Patient not identified as an unhealthy alcohol user when screened for unhealthy alcohol use using a systematic screening method Quality 130: Documentation Of Current Medications In The Medical Record: Current Medications Documented Quality 226: Preventive Care And Screening: Tobacco Use: Screening And Cessation Intervention: Patient screened for tobacco use and is an ex/non-smoker